# Patient Record
Sex: FEMALE | Race: WHITE | Employment: UNEMPLOYED | URBAN - METROPOLITAN AREA
[De-identification: names, ages, dates, MRNs, and addresses within clinical notes are randomized per-mention and may not be internally consistent; named-entity substitution may affect disease eponyms.]

---

## 2018-02-03 ENCOUNTER — APPOINTMENT (OUTPATIENT)
Dept: GENERAL RADIOLOGY | Age: 33
End: 2018-02-03
Payer: COMMERCIAL

## 2018-02-03 ENCOUNTER — HOSPITAL ENCOUNTER (EMERGENCY)
Age: 33
Discharge: HOME | End: 2018-02-03
Payer: COMMERCIAL

## 2018-02-03 VITALS
OXYGEN SATURATION: 100 % | DIASTOLIC BLOOD PRESSURE: 91 MMHG | BODY MASS INDEX: 23.92 KG/M2 | HEIGHT: 63 IN | RESPIRATION RATE: 14 BRPM | SYSTOLIC BLOOD PRESSURE: 121 MMHG | HEART RATE: 94 BPM | WEIGHT: 135 LBS | TEMPERATURE: 97.8 F

## 2018-02-03 DIAGNOSIS — J45.21 MILD INTERMITTENT ASTHMA WITH ACUTE EXACERBATION: Primary | ICD-10-CM

## 2018-02-03 PROCEDURE — 71046 X-RAY EXAM CHEST 2 VIEWS: CPT

## 2018-02-03 PROCEDURE — 6360000002 HC RX W HCPCS: Performed by: PHYSICIAN ASSISTANT

## 2018-02-03 PROCEDURE — 99284 EMERGENCY DEPT VISIT MOD MDM: CPT

## 2018-02-03 PROCEDURE — 94640 AIRWAY INHALATION TREATMENT: CPT

## 2018-02-03 RX ORDER — ALBUTEROL SULFATE 90 UG/1
2 AEROSOL, METERED RESPIRATORY (INHALATION) EVERY 6 HOURS PRN
Qty: 1 INHALER | Refills: 3 | Status: SHIPPED | OUTPATIENT
Start: 2018-02-03

## 2018-02-03 RX ORDER — PREDNISONE 20 MG/1
20 TABLET ORAL 2 TIMES DAILY
Qty: 10 TABLET | Refills: 0 | Status: SHIPPED | OUTPATIENT
Start: 2018-02-03 | End: 2018-02-08

## 2018-02-03 RX ADMIN — ALBUTEROL SULFATE 5 MG: 2.5 SOLUTION RESPIRATORY (INHALATION) at 14:33

## 2018-02-03 ASSESSMENT — ENCOUNTER SYMPTOMS
TROUBLE SWALLOWING: 0
VOMITING: 0
PHOTOPHOBIA: 0
BACK PAIN: 0
SHORTNESS OF BREATH: 1
ABDOMINAL PAIN: 0
COUGH: 0
SORE THROAT: 0

## 2018-02-03 ASSESSMENT — PAIN SCALES - GENERAL: PAINLEVEL_OUTOF10: 2

## 2018-02-03 ASSESSMENT — PULMONARY FUNCTION TESTS: PEFR_L/MIN: 310

## 2018-02-03 ASSESSMENT — PAIN DESCRIPTION - LOCATION: LOCATION: CHEST

## 2018-02-03 ASSESSMENT — PAIN DESCRIPTION - DESCRIPTORS: DESCRIPTORS: DISCOMFORT

## 2018-02-03 ASSESSMENT — PAIN DESCRIPTION - FREQUENCY: FREQUENCY: INTERMITTENT

## 2018-02-03 NOTE — ED PROVIDER NOTES
[address] [facility] ED  eMERGENCY dEPARTMENT eNCOUnter      Pt Name: Felix Geller  MRN: [id number]  [place] 1985  Date of evaluation: 2/3/2018  Provider: Lissette Harding PA-C      HISTORY OF PRESENT ILLNESS    HPI  Felix Geller is a 29 y.o. female, who presents for evaluation of shortness of breath x 4-5 weeks. Patient states that she has a history of mild asthma, but has been using an old inhaler, which she does not think is working. She chest pain. Denies travel, immobility, smoking or family history of blood clotting, on BCP. Denies any exposure to carbon monoxide. REVIEW OF SYSTEMS       Review of Systems   Constitutional: Negative for chills and fever. HENT: Negative for congestion, ear pain, sore throat and trouble swallowing. Eyes: Negative for photophobia and visual disturbance. Respiratory: Positive for shortness of breath. Negative for cough. Cardiovascular: Negative for chest pain, palpitations and leg swelling. Gastrointestinal: Negative for abdominal pain and vomiting. Genitourinary: Negative for difficulty urinating, dysuria, flank pain and hematuria. Musculoskeletal: Negative for back pain. Neurological: Negative for syncope, speech difficulty, numbness and headaches. Psychiatric/Behavioral: Negative for agitation, confusion and hallucinations. PAST MEDICAL HISTORY     Past Medical History:   Diagnosis Date    Acne     Anxiety 2012    Asthma     EXERTIONAL         SURGICAL HISTORY       Past Surgical History:   Procedure Laterality Date     SECTION  ,         CURRENT MEDICATIONS       Previous Medications    RECLIPSEN 0.15-30 MG-MCG PER TABLET           ALLERGIES     Review of patient's allergies indicates no known allergies.     FAMILY HISTORY       Family History   Problem Relation Age of Onset    Cancer Mother      BREAST    High Blood Pressure Brother     Arthritis Brother           SOCIAL HISTORY       Social History Social History    Marital status: Single     Spouse name: N/A    Number of children: N/A    Years of education: N/A     Social History Main Topics    Smoking status: Former Smoker     Types: Cigarettes     Quit date: 10/24/2005    Smokeless tobacco: Never Used    Alcohol use Yes      Comment: socially    Drug use: No    Sexual activity: Yes     Partners: Male     Other Topics Concern    None     Social History Narrative    None         PHYSICAL EXAM       ED Triage Vitals [02/03/18 1319]   BP Temp Temp Source Pulse Resp SpO2 Height Weight   (!) 161/94 97.8 °F (36.6 °C) Oral 95 18 100 % 5' 3\" (1.6 m) 135 lb (61.2 kg)       Physical Exam   Constitutional: She is oriented to person, place, and time. No distress. HENT:   Head: Normocephalic and atraumatic. Mouth/Throat: Oropharynx is clear and moist. No oropharyngeal exudate. Eyes: EOM are normal. Pupils are equal, round, and reactive to light. Neck: Neck supple. Cardiovascular: Normal rate, regular rhythm and normal heart sounds. Pulmonary/Chest: Effort normal and breath sounds normal. No respiratory distress. She has no wheezes. She exhibits no tenderness. Abdominal: Soft. Bowel sounds are normal. There is no tenderness. Musculoskeletal: Normal range of motion. She exhibits no edema. Negative Luis's sign   Neurological: She is alert and oriented to person, place, and time. No cranial nerve deficit. Skin: Skin is warm and dry. No rash noted. She is not diaphoretic. Nursing note and vitals reviewed. Procedures      MDM:   Vitals:    Vitals:    02/03/18 1319 02/03/18 1435   BP: (!) 161/94    Pulse: 95 100   Resp: 18 12   Temp: 97.8 °F (36.6 °C)    TempSrc: Oral    SpO2: 100% 100%   Weight: 135 lb (61.2 kg)    Height: 5' 3\" (1.6 m)        Chest x-ray unremarkable. Albuterol treatment given in the emergency department with relief per patient. Peak flow 310 with moderate to poor effort.   I discussed differential with the patient

## 2021-01-20 ENCOUNTER — E-VISIT (OUTPATIENT)
Dept: FAMILY MEDICINE CLINIC | Age: 36
End: 2021-01-20
Payer: COMMERCIAL

## 2021-01-20 DIAGNOSIS — N30.00 ACUTE CYSTITIS WITHOUT HEMATURIA: Primary | ICD-10-CM

## 2021-01-20 PROCEDURE — 98970 NQHP OL DIG ASSMT&MGMT 5-10: CPT | Performed by: NURSE PRACTITIONER

## 2021-01-20 RX ORDER — NITROFURANTOIN 25; 75 MG/1; MG/1
100 CAPSULE ORAL 2 TIMES DAILY
Qty: 20 CAPSULE | Refills: 0 | Status: SHIPPED | OUTPATIENT
Start: 2021-01-20 | End: 2021-01-30

## 2021-01-25 ENCOUNTER — OFFICE VISIT (OUTPATIENT)
Dept: FAMILY MEDICINE CLINIC | Age: 36
End: 2021-01-25
Payer: COMMERCIAL

## 2021-01-25 ENCOUNTER — HOSPITAL ENCOUNTER (OUTPATIENT)
Dept: CT IMAGING | Age: 36
Discharge: HOME | End: 2021-01-27
Payer: COMMERCIAL

## 2021-01-25 VITALS — HEIGHT: 64 IN | WEIGHT: 137 LBS | BODY MASS INDEX: 23.39 KG/M2

## 2021-01-25 VITALS
HEART RATE: 86 BPM | OXYGEN SATURATION: 100 % | DIASTOLIC BLOOD PRESSURE: 86 MMHG | TEMPERATURE: 98.4 F | BODY MASS INDEX: 23.52 KG/M2 | WEIGHT: 137.8 LBS | SYSTOLIC BLOOD PRESSURE: 132 MMHG | HEIGHT: 64 IN

## 2021-01-25 DIAGNOSIS — R10.31 RLQ ABDOMINAL PAIN: ICD-10-CM

## 2021-01-25 DIAGNOSIS — N30.00 ACUTE CYSTITIS WITHOUT HEMATURIA: Primary | ICD-10-CM

## 2021-01-25 DIAGNOSIS — R10.31 RLQ ABDOMINAL PAIN: Primary | ICD-10-CM

## 2021-01-25 LAB
BILIRUBIN, POC: ABNORMAL
BLOOD URINE, POC: ABNORMAL
CLARITY, POC: CLEAR
COLOR, POC: YELLOW
GLUCOSE URINE, POC: ABNORMAL
KETONES, POC: ABNORMAL
LEUKOCYTE EST, POC: ABNORMAL
NITRITE, POC: ABNORMAL
PH, POC: 6
PROTEIN, POC: ABNORMAL
SPECIFIC GRAVITY, POC: 1.02
UROBILINOGEN, POC: ABNORMAL

## 2021-01-25 PROCEDURE — 81002 URINALYSIS NONAUTO W/O SCOPE: CPT | Performed by: NURSE PRACTITIONER

## 2021-01-25 PROCEDURE — G8484 FLU IMMUNIZE NO ADMIN: HCPCS | Performed by: NURSE PRACTITIONER

## 2021-01-25 PROCEDURE — 6360000004 HC RX CONTRAST MEDICATION: Performed by: NURSE PRACTITIONER

## 2021-01-25 PROCEDURE — 99203 OFFICE O/P NEW LOW 30 MIN: CPT | Performed by: NURSE PRACTITIONER

## 2021-01-25 PROCEDURE — G8420 CALC BMI NORM PARAMETERS: HCPCS | Performed by: NURSE PRACTITIONER

## 2021-01-25 PROCEDURE — 2500000003 HC RX 250 WO HCPCS: Performed by: NURSE PRACTITIONER

## 2021-01-25 PROCEDURE — 2580000003 HC RX 258: Performed by: NURSE PRACTITIONER

## 2021-01-25 PROCEDURE — 74177 CT ABD & PELVIS W/CONTRAST: CPT

## 2021-01-25 PROCEDURE — 1036F TOBACCO NON-USER: CPT | Performed by: NURSE PRACTITIONER

## 2021-01-25 PROCEDURE — G8427 DOCREV CUR MEDS BY ELIG CLIN: HCPCS | Performed by: NURSE PRACTITIONER

## 2021-01-25 RX ORDER — CIPROFLOXACIN 500 MG/1
500 TABLET, FILM COATED ORAL 2 TIMES DAILY
Qty: 14 TABLET | Refills: 0 | Status: SHIPPED | OUTPATIENT
Start: 2021-01-25 | End: 2021-02-01

## 2021-01-25 RX ORDER — SUMATRIPTAN 50 MG/1
50 TABLET, FILM COATED ORAL
COMMUNITY

## 2021-01-25 RX ORDER — SODIUM CHLORIDE 0.9 % (FLUSH) 0.9 %
10 SYRINGE (ML) INJECTION ONCE
Status: COMPLETED | OUTPATIENT
Start: 2021-01-25 | End: 2021-01-25

## 2021-01-25 RX ADMIN — BARIUM SULFATE 450 ML: 20 SUSPENSION ORAL at 11:51

## 2021-01-25 RX ADMIN — IOPAMIDOL 100 ML: 612 INJECTION, SOLUTION INTRAVENOUS at 11:51

## 2021-01-25 RX ADMIN — Medication 10 ML: at 11:51

## 2021-01-25 SDOH — ECONOMIC STABILITY: TRANSPORTATION INSECURITY
IN THE PAST 12 MONTHS, HAS THE LACK OF TRANSPORTATION KEPT YOU FROM MEDICAL APPOINTMENTS OR FROM GETTING MEDICATIONS?: NO

## 2021-01-25 SDOH — ECONOMIC STABILITY: FOOD INSECURITY: WITHIN THE PAST 12 MONTHS, THE FOOD YOU BOUGHT JUST DIDN'T LAST AND YOU DIDN'T HAVE MONEY TO GET MORE.: NEVER TRUE

## 2021-01-25 SDOH — ECONOMIC STABILITY: TRANSPORTATION INSECURITY
IN THE PAST 12 MONTHS, HAS LACK OF TRANSPORTATION KEPT YOU FROM MEETINGS, WORK, OR FROM GETTING THINGS NEEDED FOR DAILY LIVING?: NO

## 2021-01-25 ASSESSMENT — ENCOUNTER SYMPTOMS
ABDOMINAL DISTENTION: 0
ABDOMINAL PAIN: 1
COUGH: 0
SHORTNESS OF BREATH: 0
CONSTIPATION: 0
NAUSEA: 0
VOMITING: 0

## 2021-01-25 ASSESSMENT — PATIENT HEALTH QUESTIONNAIRE - PHQ9
SUM OF ALL RESPONSES TO PHQ9 QUESTIONS 1 & 2: 0
2. FEELING DOWN, DEPRESSED OR HOPELESS: 0
SUM OF ALL RESPONSES TO PHQ QUESTIONS 1-9: 0
1. LITTLE INTEREST OR PLEASURE IN DOING THINGS: 0

## 2021-01-25 NOTE — PROGRESS NOTES
Subjective  Chief Complaint   Patient presents with   Mima Knutson    Abdominal Pain     right abdominal pain that comes and goes, had an E-visit with you on  and she was prescribed macrobid, is not helping with the pain. Abdominal Pain  This is a new problem. Episode onset: a month ago started and then came back. The onset quality is sudden. The problem occurs intermittently. The problem has been gradually worsening. The pain is located in the RLQ. The pain is at a severity of 5/10. The pain is mild. The quality of the pain is aching and burning. The abdominal pain radiates to the RLQ. Associated symptoms include frequency. Pertinent negatives include no constipation, fever, melena, nausea or vomiting. Exacerbated by: sneezing. Relieved by: resting. She has tried antibiotics for the symptoms. The treatment provided no relief.        Patient Active Problem List    Diagnosis Date Noted    Anxiety 2012    Sleep disturbance 2012    Acne 10/24/2011     Past Medical History:   Diagnosis Date    Acne     Anxiety 2012    Asthma     EXERTIONAL    IBS (irritable bowel syndrome)      Past Surgical History:   Procedure Laterality Date     SECTION  ,,      Family History   Problem Relation Age of Onset    Cancer Mother         BREAST    High Blood Pressure Brother     Arthritis Brother     Diabetes Maternal Uncle      Social History     Socioeconomic History    Marital status:      Spouse name: None    Number of children: None    Years of education: None    Highest education level: None   Occupational History    None   Social Needs    Financial resource strain: Not hard at all   [place] insecurity     Worry: Never true     Inability: Never true    Transportation needs     Medical: No     Non-medical: No   Tobacco Use    Smoking status: Former Smoker     Types: Cigarettes     Quit date: 10/24/2005     Years since quitting: 15.2    Smokeless tobacco: Never Used   Substance and Sexual Activity    Alcohol use: Yes     Comment: socially    Drug use: No    Sexual activity: Yes     Partners: Male   Lifestyle    Physical activity     Days per week: None     Minutes per session: None    Stress: None   Relationships    Social connections     Talks on phone: None     Gets together: None     Attends [employer] service: None     Active member of club or organization: None     Attends meetings of clubs or organizations: None     Relationship status: None    Intimate partner violence     Fear of current or ex partner: None     Emotionally abused: None     Physically abused: None     Forced sexual activity: None   Other Topics Concern    None   Social History Narrative    None     Current Outpatient Medications on File Prior to Visit   Medication Sig Dispense Refill    SUMAtriptan (IMITREX) 50 MG tablet Take 50 mg by mouth once as needed for Migraine      nitrofurantoin, macrocrystal-monohydrate, (MACROBID) 100 MG capsule Take 1 capsule by mouth 2 times daily for 10 days 20 capsule 0    albuterol sulfate HFA (VENTOLIN HFA) 108 (90 Base) MCG/ACT inhaler Inhale 2 puffs into the lungs every 6 hours as needed for Wheezing 1 Inhaler 3    [DISCONTINUED] Norethin Ace-Eth Estrad-FE (LOESTRIN 24 FE) 1-20 MG-MCG(24) TABS Take 0.0357 Packages by mouth Daily. 28 tablet 11     No current facility-administered medications on file prior to visit. No Known Allergies    Review of Systems   Constitutional: Negative for fever. Respiratory: Negative for cough and shortness of breath. Cardiovascular: Negative for chest pain. Gastrointestinal: Positive for abdominal pain. Negative for abdominal distention, constipation, melena, nausea and vomiting. Genitourinary: Positive for frequency.        Objective  Vitals:    01/25/21 0929   BP: 132/86   Site: Left Upper Arm   Position: Sitting   Cuff Size: Medium Adult   Pulse: 86   Temp: 98.4 °F (36.9 °C)   SpO2: 100% Weight: 137 lb 12.8 oz (62.5 kg)   Height: 5' 4\" (1.626 m)     Physical Exam  Vitals signs and nursing note reviewed. Constitutional:       Appearance: Normal appearance. She is normal weight. HENT:      Head: Normocephalic. Mouth/Throat:      Mouth: Mucous membranes are moist.   Eyes:      Extraocular Movements: Extraocular movements intact. Conjunctiva/sclera: Conjunctivae normal.      Pupils: Pupils are equal, round, and reactive to light. Cardiovascular:      Rate and Rhythm: Normal rate and regular rhythm. Pulses: Normal pulses. Heart sounds: Normal heart sounds. Pulmonary:      Effort: Pulmonary effort is normal.      Breath sounds: Normal breath sounds. Abdominal:      Palpations: Abdomen is soft. There is no mass. Tenderness: There is abdominal tenderness (RLQ) in the right lower quadrant. There is guarding and rebound. Skin:     General: Skin is warm. Neurological:      General: No focal deficit present. Mental Status: She is alert and oriented to person, place, and time. Mental status is at baseline. Psychiatric:         Mood and Affect: Mood normal.         Behavior: Behavior normal.         Thought Content: Thought content normal.         Judgment: Judgment normal.       Assessment & Plan     Diagnosis Orders   1. RLQ abdominal pain  POCT Urinalysis no Micro    CT ABDOMEN PELVIS W IV CONTRAST Additional Contrast? Radiologist Recommendation    Culture, Urine       Orders Placed This Encounter   Procedures    Culture, Urine     Standing Status:   Future     Standing Expiration Date:   1/25/2022     Order Specific Question:   Specify (ex-cath, midstream, cysto, etc)?      Answer:   midstream    CT ABDOMEN PELVIS W IV CONTRAST Additional Contrast? Radiologist Recommendation     Standing Status:   Future     Standing Expiration Date:   1/25/2022     Order Specific Question:   Additional Contrast?     Answer:   Radiologist Recommendation     Order Specific Question:   Reason for exam:     Answer:   RLQ pain, guarding    POCT Urinalysis no Micro     Side effects, adverse effects of the medication prescribed today, as well as treatment plan/ rationale and result expectations have been discussed with the patient who expresses understanding and desires to proceed. Close follow up to evaluate treatment results and for coordination of care. I have reviewed the patient's medical history in detail and updated the computerized patient record. As always, patient is advised that if symptoms worsen in any way they will proceed to the nearest emergency room. No orders of the defined types were placed in this encounter. We will fu with pt after CT is done today. Need to r/o appendix vs ovarian issue.     Wilbur Suresh, APRN - CNP

## 2021-01-27 LAB — URINE CULTURE, ROUTINE: NORMAL

## 2021-02-10 ENCOUNTER — HOSPITAL ENCOUNTER (OUTPATIENT)
Dept: ULTRASOUND IMAGING | Age: 36
Discharge: HOME | End: 2021-02-12
Payer: COMMERCIAL

## 2021-02-10 DIAGNOSIS — R10.31 RLQ ABDOMINAL PAIN: ICD-10-CM

## 2021-02-10 PROCEDURE — 76830 TRANSVAGINAL US NON-OB: CPT

## 2021-02-10 PROCEDURE — 76856 US EXAM PELVIC COMPLETE: CPT

## 2023-03-02 ENCOUNTER — OFFICE VISIT (OUTPATIENT)
Dept: FAMILY MEDICINE CLINIC | Age: 38
End: 2023-03-02
Payer: COMMERCIAL

## 2023-03-02 ENCOUNTER — TELEPHONE (OUTPATIENT)
Dept: FAMILY MEDICINE CLINIC | Age: 38
End: 2023-03-02

## 2023-03-02 VITALS
DIASTOLIC BLOOD PRESSURE: 62 MMHG | SYSTOLIC BLOOD PRESSURE: 104 MMHG | WEIGHT: 134 LBS | HEART RATE: 76 BPM | HEIGHT: 64 IN | BODY MASS INDEX: 22.88 KG/M2 | OXYGEN SATURATION: 99 %

## 2023-03-02 DIAGNOSIS — N64.59 ABNORMAL BREAST FINDING: Primary | ICD-10-CM

## 2023-03-02 DIAGNOSIS — N64.59 ABNORMAL BREAST EXAM: Primary | ICD-10-CM

## 2023-03-02 PROCEDURE — 1036F TOBACCO NON-USER: CPT | Performed by: NURSE PRACTITIONER

## 2023-03-02 PROCEDURE — 99213 OFFICE O/P EST LOW 20 MIN: CPT | Performed by: NURSE PRACTITIONER

## 2023-03-02 PROCEDURE — G8484 FLU IMMUNIZE NO ADMIN: HCPCS | Performed by: NURSE PRACTITIONER

## 2023-03-02 PROCEDURE — G8420 CALC BMI NORM PARAMETERS: HCPCS | Performed by: NURSE PRACTITIONER

## 2023-03-02 PROCEDURE — G8427 DOCREV CUR MEDS BY ELIG CLIN: HCPCS | Performed by: NURSE PRACTITIONER

## 2023-03-02 RX ORDER — NORETHINDRONE ACETATE AND ETHINYL ESTRADIOL, AND FERROUS FUMARATE 1MG-20(24)
KIT ORAL
COMMUNITY

## 2023-03-02 SDOH — ECONOMIC STABILITY: INCOME INSECURITY: HOW HARD IS IT FOR YOU TO PAY FOR THE VERY BASICS LIKE FOOD, HOUSING, MEDICAL CARE, AND HEATING?: NOT HARD AT ALL

## 2023-03-02 SDOH — ECONOMIC STABILITY: FOOD INSECURITY: WITHIN THE PAST 12 MONTHS, YOU WORRIED THAT YOUR FOOD WOULD RUN OUT BEFORE YOU GOT MONEY TO BUY MORE.: NEVER TRUE

## 2023-03-02 SDOH — ECONOMIC STABILITY: FOOD INSECURITY: WITHIN THE PAST 12 MONTHS, THE FOOD YOU BOUGHT JUST DIDN'T LAST AND YOU DIDN'T HAVE MONEY TO GET MORE.: NEVER TRUE

## 2023-03-02 SDOH — ECONOMIC STABILITY: HOUSING INSECURITY
IN THE LAST 12 MONTHS, WAS THERE A TIME WHEN YOU DID NOT HAVE A STEADY PLACE TO SLEEP OR SLEPT IN A SHELTER (INCLUDING NOW)?: NO

## 2023-03-02 ASSESSMENT — PATIENT HEALTH QUESTIONNAIRE - PHQ9
SUM OF ALL RESPONSES TO PHQ9 QUESTIONS 1 & 2: 0
1. LITTLE INTEREST OR PLEASURE IN DOING THINGS: 0
SUM OF ALL RESPONSES TO PHQ QUESTIONS 1-9: 0
SUM OF ALL RESPONSES TO PHQ QUESTIONS 1-9: 0
2. FEELING DOWN, DEPRESSED OR HOPELESS: 0
SUM OF ALL RESPONSES TO PHQ QUESTIONS 1-9: 0
SUM OF ALL RESPONSES TO PHQ QUESTIONS 1-9: 0

## 2023-03-02 ASSESSMENT — ENCOUNTER SYMPTOMS
COUGH: 0
SHORTNESS OF BREATH: 0

## 2023-03-02 NOTE — PROGRESS NOTES
Subjective  Chief Complaint   Patient presents with    Breast Problem     Pt states she would like a breast exam, has felt some kind of scar tissue she would like looked at. Family history of breast cancer. HPI    Pt noticed a change in her left breast recently. Not painful. Does have implants.    Mom does have a hx of breast cancer  Wanted to get it checked out bc it makes her nervous    Patient Active Problem List    Diagnosis Date Noted    Anxiety 2012    Sleep disturbance 2012    Acne 10/24/2011     Past Medical History:   Diagnosis Date    Acne     Anxiety 2012    Asthma     EXERTIONAL    IBS (irritable bowel syndrome)      Past Surgical History:   Procedure Laterality Date     SECTION  ,,      Family History   Problem Relation Age of Onset    Cancer Mother         BREAST    High Blood Pressure Brother     Arthritis Brother     Diabetes Maternal Uncle      Social History     Socioeconomic History    Marital status:      Spouse name: None    Number of children: None    Years of education: None    Highest education level: None   Tobacco Use    Smoking status: Former     Types: Cigarettes     Quit date: 10/24/2005     Years since quittin.3    Smokeless tobacco: Never   Substance and Sexual Activity    Alcohol use: Yes     Comment: socially    Drug use: No    Sexual activity: Yes     Partners: Male     Social Determinants of Health     Financial Resource Strain: Low Risk     Difficulty of Paying Living Expenses: Not hard at all   Food Insecurity: No Food Insecurity    Worried About Running Out of Food in the Last Year: Never true    [address] in the Last Year: Never true   Transportation Needs: Unknown    Lack of Transportation (Non-Medical): No   Housing Stability: Unknown    Unstable Housing in the Last Year: No     Current Outpatient Medications on File Prior to Visit   Medication Sig Dispense Refill    Norethin Ace-Eth Estrad-FE (TARAN ) 1-20 MG-MCG(24) TABS       albuterol sulfate HFA (VENTOLIN HFA) 108 (90 Base) MCG/ACT inhaler Inhale 2 puffs into the lungs every 6 hours as needed for Wheezing 1 Inhaler 3     No current facility-administered medications on file prior to visit. No Known Allergies    Review of Systems   Respiratory:  Negative for cough and shortness of breath. Cardiovascular:  Negative for chest pain. Objective  Vitals:    03/02/23 0805   BP: 104/62   Pulse: 76   SpO2: 99%   Weight: 134 lb (60.8 kg)   Height: 5' 4\" (1.626 m)     Physical Exam  Vitals and nursing note reviewed. Constitutional:       Appearance: Normal appearance. Pulmonary:      Effort: Pulmonary effort is normal.   Chest:       Musculoskeletal:      Cervical back: Neck supple. Skin:     General: Skin is warm. Neurological:      General: No focal deficit present. Mental Status: She is alert and oriented to person, place, and time. Mental status is at baseline. Psychiatric:         Mood and Affect: Mood normal.         Behavior: Behavior normal.         Thought Content: Thought content normal.         Judgment: Judgment normal.       Assessment & Plan     Diagnosis Orders   1. Abnormal breast exam  US BREAST COMPLETE LEFT          Orders Placed This Encounter   Procedures    US BREAST COMPLETE LEFT     Standing Status:   Future     Standing Expiration Date:   3/1/2024     Order Specific Question:   Reason for exam:     Answer:   abnormal breast exam       No orders of the defined types were placed in this encounter. Medications Discontinued During This Encounter   Medication Reason    SUMAtriptan (IMITREX) 50 MG tablet LIST CLEANUP      Side effects, adverse effects of the medication prescribed today, as well as treatment plan/ rationale and result expectations have been discussed with the patient who expresses understanding and desires to proceed. Close follow up to evaluate treatment results and for coordination of care.   I have reviewed the patient's medical history in detail and updated the computerized patient record. As always, patient is advised that if symptoms worsen in any way they will proceed to the nearest emergency room.           TONNY Gould - CNP

## 2023-03-02 NOTE — TELEPHONE ENCOUNTER
[facility] to have [facility]  mammogram with US ordered they are not able to do the [address] only

## 2023-03-20 ENCOUNTER — OFFICE VISIT (OUTPATIENT)
Dept: SURGERY | Age: 38
End: 2023-03-20
Payer: COMMERCIAL

## 2023-03-20 VITALS
HEIGHT: 64 IN | OXYGEN SATURATION: 98 % | DIASTOLIC BLOOD PRESSURE: 91 MMHG | RESPIRATION RATE: 12 BRPM | BODY MASS INDEX: 23.22 KG/M2 | WEIGHT: 136 LBS | HEART RATE: 73 BPM | TEMPERATURE: 97.8 F | SYSTOLIC BLOOD PRESSURE: 137 MMHG

## 2023-03-20 DIAGNOSIS — N60.12 FIBROCYSTIC BREAST CHANGES OF BOTH BREASTS: Primary | ICD-10-CM

## 2023-03-20 DIAGNOSIS — N60.11 FIBROCYSTIC BREAST CHANGES OF BOTH BREASTS: Primary | ICD-10-CM

## 2023-03-20 DIAGNOSIS — Z91.89 AT HIGH RISK FOR BREAST CANCER: ICD-10-CM

## 2023-03-20 DIAGNOSIS — Z80.3 FAMILY HISTORY OF BREAST CANCER IN MOTHER: ICD-10-CM

## 2023-03-20 DIAGNOSIS — Z12.31 BREAST CANCER SCREENING BY MAMMOGRAM: ICD-10-CM

## 2023-03-20 PROCEDURE — G8484 FLU IMMUNIZE NO ADMIN: HCPCS | Performed by: SURGERY

## 2023-03-20 PROCEDURE — G8420 CALC BMI NORM PARAMETERS: HCPCS | Performed by: SURGERY

## 2023-03-20 PROCEDURE — G8427 DOCREV CUR MEDS BY ELIG CLIN: HCPCS | Performed by: SURGERY

## 2023-03-20 PROCEDURE — 99203 OFFICE O/P NEW LOW 30 MIN: CPT | Performed by: SURGERY

## 2023-03-20 RX ORDER — M-VIT,TX,IRON,MINS/CALC/FOLIC 27MG-0.4MG
1 TABLET ORAL DAILY
COMMUNITY

## 2023-03-20 ASSESSMENT — ENCOUNTER SYMPTOMS
NAUSEA: 0
VOMITING: 0
SORE THROAT: 0
COLOR CHANGE: 0
CHEST TIGHTNESS: 0
SHORTNESS OF BREATH: 0
COUGH: 0
ABDOMINAL PAIN: 0

## 2023-03-20 NOTE — PROGRESS NOTES
get it  5. Recommend an active lifestyle, healthy diet, limited alcohol intake, achieve and maintain a healthy BMI to optimize breast cancer outcomes / decrease risk of breast cancer. If she is a smoker, to consider quitting smoking. I recommend screening twice a year. Continue Self Breast Awareness. The patient is in agreement. Bilateral MRI 6 month(s)  CBE 6 month(s)  Bilateral Screening Mammogram 1 week(s)       I spent a total of 40 minutes on the date of the service which included preparing to see the patient, face-to-face patient care, completing clinical documentation, obtaining and/or reviewing separately obtained history, performing a medically appropriate examination, counseling and educating the patient/family/caregiver, ordering medications, tests, or procedures, communicating with other HCPs (not separately reported), independently interpreting results (not separately reported), communicating results to the patient/family/caregiver and care coordination (not separately reported). Discussion regarding natural history and potential progression of breast changes, timing of surveillance visits, timing and type of surveillance imaging, life-style modification, exercise, and limiting alcohol intake were performed today. I personally and independently saw and examined patient and reviewed all pertinent laboratory data and imaging studies. I have reviewed and agree with the history and review of systems as documented in the above note. This document is generated, in part, by voice recognition software and thus syntax and grammatical errors are possible. Lissette Caballero MD    CC: TONNY Callaway - CNP, No ref. provider found    The chief complaint, extensive medical and family history, and review of systems were asked and reviewed with the patient by me. I also reviewed the note in detail.     This note was partially generated using Dragon voice recognition system, and there may be some

## 2023-03-22 ENCOUNTER — TELEPHONE (OUTPATIENT)
Dept: WOMENS IMAGING | Age: 38
End: 2023-03-22

## 2023-03-22 NOTE — TELEPHONE ENCOUNTER
03/22/23  Patient has been scheduled for her MRI of breast for August 21, 2023 to arrive at 800AM for 830AM appointment. Reviewed test with patient and answered questions. Encouraged patient to call with any questions or concerns.

## 2024-07-31 ENCOUNTER — APPOINTMENT (OUTPATIENT)
Dept: CARDIOLOGY | Facility: CLINIC | Age: 39
End: 2024-07-31
Payer: COMMERCIAL

## 2024-07-31 VITALS
SYSTOLIC BLOOD PRESSURE: 130 MMHG | WEIGHT: 132 LBS | BODY MASS INDEX: 23.39 KG/M2 | HEIGHT: 63 IN | DIASTOLIC BLOOD PRESSURE: 82 MMHG | HEART RATE: 86 BPM

## 2024-07-31 DIAGNOSIS — Z78.9 NON-SMOKER: ICD-10-CM

## 2024-07-31 DIAGNOSIS — R00.2 PALPITATIONS: Primary | ICD-10-CM

## 2024-07-31 DIAGNOSIS — Z82.49 FAMILY HISTORY OF CORONARY ARTERY DISEASE: ICD-10-CM

## 2024-07-31 PROCEDURE — 3008F BODY MASS INDEX DOCD: CPT | Performed by: INTERNAL MEDICINE

## 2024-07-31 PROCEDURE — 99204 OFFICE O/P NEW MOD 45 MIN: CPT | Performed by: INTERNAL MEDICINE

## 2024-07-31 PROCEDURE — 93000 ELECTROCARDIOGRAM COMPLETE: CPT | Performed by: INTERNAL MEDICINE

## 2024-07-31 RX ORDER — PROPRANOLOL HYDROCHLORIDE 20 MG/1
20 TABLET ORAL DAILY
COMMUNITY
Start: 2024-06-28

## 2024-07-31 RX ORDER — NORETHINDRONE ACETATE/ETHINYL ESTRADIOL AND FERROUS FUMARATE 1MG-20(24)
1 KIT ORAL DAILY
COMMUNITY

## 2024-07-31 NOTE — PATIENT INSTRUCTIONS
"Chief Complaint  Arm Swelling (After lab draw; LEFT )    Subjective          John Becker presents to [facility]  History of Present Illness  Arm swelling  New. Patient reports occurred after blood draw yesterday at 0800 by phlebotomist at our office  in left ac area. Reports she knows he went through vein, had to pull back to get blood return, then pressed on the needle while extracting which caused the bevel of needle to drag along her skin causing pain. Reports when she got home she immediately had a large hematoma which she tried to put pressure on/rub to disperse. She also had swelling develop distally a few hours later. When she woke up this morning she had throbbing, more spreading of bruising all along ac area and down forearm.       Objective   Vital Signs:   /78 (BP Location: Right arm, Patient Position: Sitting, Cuff Size: Adult)   Pulse 81   Temp 98.4 °F (36.9 °C) (Infrared)   Resp 16   Ht 170.2 cm (67.01\")   Wt 72.6 kg (160 lb)   SpO2 100%   BMI 25.05 kg/m²     Physical Exam  Vitals and nursing note reviewed.   Constitutional:       Appearance: She is well-developed.   HENT:      Head: Normocephalic and atraumatic.   Eyes:      Conjunctiva/sclera: Conjunctivae normal.   Cardiovascular:      Rate and Rhythm: Normal rate and regular rhythm.   Pulmonary:      Effort: Pulmonary effort is normal.   Musculoskeletal:      Cervical back: Normal range of motion.   Skin:     Findings: Bruising present.      Comments: Hematoma left ac area with surrounding bruising- see images.    Neurological:      General: No focal deficit present.      Mental Status: She is alert and oriented to person, place, and time.   Psychiatric:         Mood and Affect: Mood normal.         Behavior: Behavior normal.         Left ac- from patient's phone- taken immediately following blood draw showing hematoma        Left ac- taken in office today by me      Result Review :               " HAVE LABS DONE SOON (FASTING LABS)      Assessment and Plan    Diagnoses and all orders for this visit:    1. Traumatic hematoma of left upper arm, initial encounter (Primary)  -     US Venous Doppler Upper Extremity Left (duplex); Future    Other orders  -     cephalexin (Keflex) 500 MG capsule; Take 1 capsule by mouth 3 (Three) Times a Day.  Dispense: 21 capsule; Refill: 0      Plan:  Us lue today   Keflex if needed- advised on ss of complications/infection. Do not start antibiotic unless infection symptoms occur.   Advised compression x24 hours (ace wrap) and elevate  Apply cool compresses for 24 hours- then warm compresses until resolved               Follow Up   Return in about 3 days (around 5/2/2022), or if symptoms worsen or fail to improve.  Patient was given instructions and counseling regarding her condition or for health maintenance advice. Please see specific information pulled into the AVS if appropriate.

## 2024-07-31 NOTE — PROGRESS NOTES
Referred by Dr. Schwartz for New Patient Visit     History Of Present Illness:    Mary Doyle is a 39 y.o. female presenting with concerns for cardiovascular health.  She wants to take a proactive role.  Recently both parents have severe cardiac issues.  They are both in their early 60s.  Maternal aunt also has coronary artery disease.  Mother had mitral valve prolapse open heart surgery and coronary artery bypass graft as well along with mitral valve surgery.  Works full-time, mother of 3 teenage children,  owns construction business  Starting in her teenage years, she says she had difficulty with gym class.  She said she could never run a mile.  She was given a diagnosis of exercise-induced asthma, but she seldom uses inhalers.  She does not have wheezing or coughing with activity.  No orthopnea PND or GERD symptoms  .  Occasional palpitations, very infrequent diagnostic yield likely to be very low  No chest pressure tightness or heaviness.  No claudication  Never a smoker  No unintentional weight gain or weight loss  Uses propranolol for performance anxiety    No history of migraine headaches    12 point review of systems negative or noncontributory except as noted  Past Medical History:  She has a past medical history of Paresthesia of skin and Personal history of other diseases of the musculoskeletal system and connective tissue.    Past Surgical History:  She has a past surgical history that includes Other surgical history (11/25/2019).      Social History:  She reports that she has never smoked. She has never used smokeless tobacco. No history on file for alcohol use and drug use.    Family History: As noted in the body of the report       Allergies:  Patient has no known allergies.    Outpatient Medications:  Current Outpatient Medications   Medication Instructions    Harjeet 24 Fe 1 mg-20 mcg (24)/75 mg (4) tablet 1 tablet, oral, Daily    propranolol (INDERAL) 20 mg, oral, Daily        Last Recorded  "Vitals:  Vitals:    07/31/24 0902   BP: 130/82   BP Location: Right arm   Patient Position: Sitting   Pulse: 86   Weight: 59.9 kg (132 lb)   Height: 1.6 m (5' 3\")       Physical Exam:    GENERAL APPEARANCE: Well developed, well nourished, in no acute distress.  CHEST: Symmetric and non-tender.  INTEGUMENT: Skin warm and dry, without gross excoriationis or lesions.  HEENT: No gross abnormalities, no jugular venous distention no carotid bruit or scleral icterus  NECK: Supple, no JVD, no bruit. Thyroid not palpable. Carotid upstrokes normal.  NEURO/PSHCY: Alert and oriented x3; appropriate behavior and responses and responses, with normal balance and coordination  LUNGS: Clear to auscultation bilaterally; normal respiratory effort.  HEART: Rate and rhythm regular with no evident murmur; no gallop appreciated. There are no rubs, clicks or heaves.   ABDOMEN: Soft, nontender, no masses  or bruits.   MUSCULOSKELETAL: No obvious deformity identified  EXTREMITIES: Warm  There is no edema noted.  PERIPHERAL VASCULAR: Pulses present and equally palpable; 2+ throughout.          Labs reviewed today : No laboratory data for review    Assessment/Plan       1. Palpitations  ECG 12 lead (Clinic Performed)    Stress Test    Lipid panel    T4, free    TSH    Comprehensive metabolic panel    CBC    Transthoracic Echo (TTE) Complete    Follow Up In Cardiology      2. BMI 23.0-23.9, adult  Referral to Primary Care    Lipid panel    T4, free    TSH    Comprehensive metabolic panel    CBC    Follow Up In Cardiology      3. Non-smoker  Referral to Primary Care    Lipid panel    T4, free    TSH    Comprehensive metabolic panel    CBC    Follow Up In Cardiology      4. Family history of coronary artery disease  Stress Test    Lipid panel    T4, free    TSH    Comprehensive metabolic panel    CBC    Transthoracic Echo (TTE) Complete    CT cardiac scoring wo IV contrast    Follow Up In Cardiology           Clinical decision making:  Patient " wants to take a proactive role on her cardiovascular health.  She finds it difficult to do aerobic activity, and that she gets short of breath.  This is not related to her beta-blocker because she uses it very seldom.  Recently both parents have significant cardiovascular issues and maternal aunt also has coronary artery disease.  Mother reportedly has mitral valve prolapse.  We talked about the typical symptoms and the atypical symptoms of CAD.  We will proceed with blood work as ordered, echocardiogram and a treadmill stress test.  Will refer her to Dr. Jean-Baptiste to establish primary care.  Follow-up after testing    Provider Attestation - Scribe documentation    All medical record entries made by the Scribe were at my direction and personally dictated by me. I have reviewed the chart and agree that the record accurately reflects my personal performance of the history, physical exam, discussion and plan.

## 2024-08-02 ENCOUNTER — LAB (OUTPATIENT)
Dept: LAB | Facility: LAB | Age: 39
End: 2024-08-02
Payer: COMMERCIAL

## 2024-08-02 DIAGNOSIS — Z78.9 NON-SMOKER: ICD-10-CM

## 2024-08-02 DIAGNOSIS — Z82.49 FAMILY HISTORY OF CORONARY ARTERY DISEASE: ICD-10-CM

## 2024-08-02 DIAGNOSIS — R00.2 PALPITATIONS: ICD-10-CM

## 2024-08-02 LAB
ALBUMIN SERPL BCP-MCNC: 4.3 G/DL (ref 3.4–5)
ALP SERPL-CCNC: 45 U/L (ref 33–110)
ALT SERPL W P-5'-P-CCNC: 10 U/L (ref 7–45)
ANION GAP SERPL CALC-SCNC: 12 MMOL/L (ref 10–20)
AST SERPL W P-5'-P-CCNC: 12 U/L (ref 9–39)
BILIRUB SERPL-MCNC: 0.5 MG/DL (ref 0–1.2)
BUN SERPL-MCNC: 9 MG/DL (ref 6–23)
CALCIUM SERPL-MCNC: 9.5 MG/DL (ref 8.6–10.3)
CHLORIDE SERPL-SCNC: 104 MMOL/L (ref 98–107)
CHOLEST SERPL-MCNC: 208 MG/DL (ref 0–199)
CHOLESTEROL/HDL RATIO: 2.9
CO2 SERPL-SCNC: 26 MMOL/L (ref 21–32)
CREAT SERPL-MCNC: 0.79 MG/DL (ref 0.5–1.05)
EGFRCR SERPLBLD CKD-EPI 2021: >90 ML/MIN/1.73M*2
ERYTHROCYTE [DISTWIDTH] IN BLOOD BY AUTOMATED COUNT: 11.9 % (ref 11.5–14.5)
GLUCOSE SERPL-MCNC: 94 MG/DL (ref 74–99)
HCT VFR BLD AUTO: 41 % (ref 36–46)
HDLC SERPL-MCNC: 71.7 MG/DL
HGB BLD-MCNC: 13.7 G/DL (ref 12–16)
LDLC SERPL CALC-MCNC: 110 MG/DL
MCH RBC QN AUTO: 31.4 PG (ref 26–34)
MCHC RBC AUTO-ENTMCNC: 33.4 G/DL (ref 32–36)
MCV RBC AUTO: 94 FL (ref 80–100)
NON HDL CHOLESTEROL: 136 MG/DL (ref 0–149)
NRBC BLD-RTO: 0 /100 WBCS (ref 0–0)
PLATELET # BLD AUTO: 279 X10*3/UL (ref 150–450)
POTASSIUM SERPL-SCNC: 4 MMOL/L (ref 3.5–5.3)
PROT SERPL-MCNC: 7.2 G/DL (ref 6.4–8.2)
RBC # BLD AUTO: 4.37 X10*6/UL (ref 4–5.2)
SODIUM SERPL-SCNC: 138 MMOL/L (ref 136–145)
T4 FREE SERPL-MCNC: 0.87 NG/DL (ref 0.61–1.12)
TRIGL SERPL-MCNC: 133 MG/DL (ref 0–149)
TSH SERPL-ACNC: 1.81 MIU/L (ref 0.44–3.98)
VLDL: 27 MG/DL (ref 0–40)
WBC # BLD AUTO: 7.3 X10*3/UL (ref 4.4–11.3)

## 2024-08-02 PROCEDURE — 36415 COLL VENOUS BLD VENIPUNCTURE: CPT

## 2024-08-02 PROCEDURE — 84443 ASSAY THYROID STIM HORMONE: CPT

## 2024-08-02 PROCEDURE — 85027 COMPLETE CBC AUTOMATED: CPT

## 2024-08-02 PROCEDURE — 80061 LIPID PANEL: CPT

## 2024-08-02 PROCEDURE — 80053 COMPREHEN METABOLIC PANEL: CPT

## 2024-08-02 PROCEDURE — 84439 ASSAY OF FREE THYROXINE: CPT

## 2024-08-21 ENCOUNTER — APPOINTMENT (OUTPATIENT)
Facility: HOSPITAL | Age: 39
End: 2024-08-21
Payer: COMMERCIAL

## 2024-09-04 ENCOUNTER — ANCILLARY PROCEDURE (OUTPATIENT)
Dept: CARDIOLOGY | Facility: HOSPITAL | Age: 39
End: 2024-09-04
Payer: COMMERCIAL

## 2024-09-04 ENCOUNTER — APPOINTMENT (OUTPATIENT)
Dept: CARDIOLOGY | Facility: HOSPITAL | Age: 39
End: 2024-09-04
Payer: COMMERCIAL

## 2024-09-04 DIAGNOSIS — Z82.49 FAMILY HISTORY OF CORONARY ARTERY DISEASE: ICD-10-CM

## 2024-09-04 DIAGNOSIS — R00.2 PALPITATIONS: ICD-10-CM

## 2024-09-04 LAB
AORTIC VALVE MEAN GRADIENT: 3 MMHG
AORTIC VALVE PEAK VELOCITY: 1.12 M/S
AV PEAK GRADIENT: 5 MMHG
AVA (PEAK VEL): 2.4 CM2
AVA (VTI): 2.37 CM2
EJECTION FRACTION APICAL 4 CHAMBER: 63.8
EJECTION FRACTION: 63 %
LEFT VENTRICLE INTERNAL DIMENSION DIASTOLE: 3.93 CM (ref 3.5–6)
LEFT VENTRICULAR OUTFLOW TRACT DIAMETER: 1.9 CM
LV EJECTION FRACTION BIPLANE: 60 %
MITRAL VALVE E/A RATIO: 1.39
MITRAL VALVE E/E' RATIO: 4.2
RIGHT VENTRICLE PEAK SYSTOLIC PRESSURE: 23.3 MMHG

## 2024-09-04 PROCEDURE — 93306 TTE W/DOPPLER COMPLETE: CPT | Performed by: INTERNAL MEDICINE

## 2024-09-04 PROCEDURE — 93306 TTE W/DOPPLER COMPLETE: CPT

## 2024-09-05 ENCOUNTER — ANCILLARY PROCEDURE (OUTPATIENT)
Facility: HOSPITAL | Age: 39
End: 2024-09-05
Payer: COMMERCIAL

## 2024-09-05 DIAGNOSIS — Z82.49 FAMILY HISTORY OF CORONARY ARTERY DISEASE: ICD-10-CM

## 2024-09-05 PROCEDURE — 75571 CT HRT W/O DYE W/CA TEST: CPT

## 2024-09-10 ENCOUNTER — APPOINTMENT (OUTPATIENT)
Dept: CARDIOLOGY | Facility: CLINIC | Age: 39
End: 2024-09-10
Payer: COMMERCIAL

## 2024-10-29 ENCOUNTER — APPOINTMENT (OUTPATIENT)
Dept: CARDIOLOGY | Facility: CLINIC | Age: 39
End: 2024-10-29
Payer: COMMERCIAL